# Patient Record
Sex: FEMALE | Race: WHITE | NOT HISPANIC OR LATINO | Employment: STUDENT | URBAN - METROPOLITAN AREA
[De-identification: names, ages, dates, MRNs, and addresses within clinical notes are randomized per-mention and may not be internally consistent; named-entity substitution may affect disease eponyms.]

---

## 2024-08-26 ENCOUNTER — OFFICE VISIT (OUTPATIENT)
Dept: URGENT CARE | Facility: URGENT CARE | Age: 24
End: 2024-08-26

## 2024-08-26 VITALS
SYSTOLIC BLOOD PRESSURE: 125 MMHG | TEMPERATURE: 98.1 F | HEART RATE: 92 BPM | RESPIRATION RATE: 14 BRPM | DIASTOLIC BLOOD PRESSURE: 77 MMHG | OXYGEN SATURATION: 100 %

## 2024-08-26 DIAGNOSIS — L08.9 LOCAL INFECTION OF SKIN AND SUBCUTANEOUS TISSUE: ICD-10-CM

## 2024-08-26 DIAGNOSIS — L30.9 ECZEMA, UNSPECIFIED TYPE: Primary | ICD-10-CM

## 2024-08-26 PROCEDURE — 99203 OFFICE O/P NEW LOW 30 MIN: CPT | Performed by: PHYSICIAN ASSISTANT

## 2024-08-26 RX ORDER — TRIAMCINOLONE ACETONIDE 1 MG/G
OINTMENT TOPICAL 2 TIMES DAILY
Qty: 80 G | Refills: 1 | Status: SHIPPED | OUTPATIENT
Start: 2024-08-26 | End: 2024-09-09

## 2024-08-26 RX ORDER — CEPHALEXIN 500 MG/1
500 CAPSULE ORAL 4 TIMES DAILY
Qty: 28 CAPSULE | Refills: 0 | Status: SHIPPED | OUTPATIENT
Start: 2024-08-26 | End: 2024-09-02

## 2024-08-27 NOTE — PROGRESS NOTES
Assessment & Plan     (L30.9) Eczema, unspecified type  (primary encounter diagnosis)  Comment:   Plan: triamcinolone (KENALOG) 0.1 % external         ointment, Adult Dermatology  Referral      Presentation and physical exam are consistent with eczema especially affecting the hands, also affecting the forearms and elbows and posterior aspect of the knees.  Prescribe triamcinolone ointment to be applied twice daily.  Recommend applying ointment after routine skin cleansing and then applying Saran wrap after ointment is applied.  This will keep the ointment in contact with the skin longer.  Also prescribed Keflex due to concern for localized skin infection, especially on hands.  Also provided a dermatology referral given the patient has had chronic eczema with acute flares such as what is evident today on exam.    No findings consistent with angioedema or anaphylaxis today.  Recommend urgent follow-up with the patient develops worsening breathing concerns, throat swelling, chest pain or shortness of breath or high fevers.  The patient verbalizes understanding and agrees with the above plan.        (L08.9) Local infection of skin and subcutaneous tissue  Comment:   Plan: cephALEXin (KEFLEX) 500 MG capsule                    No follow-ups on file.    Jason Oseguera PA-C  [facility] [place]    Patricia Ruiz is a 23 year old female who presents to clinic today for the following health issues:  Chief Complaint   Patient presents with    Urgent Care    Rash     Patient presents with a rash on both hands, arms and legs. Patient states [place]        HPI    The patient is a 23-year-old female with a past medical history of eczema who presents for evaluation of rash especially in her hands, forearms and legs.  States that she was visiting her parents from over the weekend.  States the rash tends to get worse when traveling.  She has been treated the  rash with triamcinolone cream.  On occasion she has needed Keflex due to infection associated with the rash she developed a lot of open sores.  She has needed prednisone at times for treatment of the rash as well.  She has no symptoms consistent with angioedema or anaphylaxis today.  No concerns for breathing or swallowing.  Would like some help treating the rash today.      Review of Systems  Constitutional, HEENT, cardiovascular, pulmonary, gi and gu systems are negative, except as otherwise noted.      Objective    /77 (BP Location: Right arm)   Pulse 92   Temp 98.1  F (36.7  C) (Temporal)   Resp 14   SpO2 100%   Physical Exam  Constitutional:       General: She is not in acute distress.     Appearance: Normal appearance. She is not ill-appearing, toxic-appearing or diaphoretic.   HENT:      Head: Normocephalic and atraumatic.      Mouth/Throat:      Mouth: Mucous membranes are moist.      Pharynx: Oropharynx is clear. No oropharyngeal exudate or posterior oropharyngeal erythema.   Pulmonary:      Effort: Pulmonary effort is normal. No respiratory distress.      Breath sounds: Normal breath sounds. No stridor. No wheezing, rhonchi or rales.   Skin:     General: Skin is warm and dry.      Findings: Rash present.      Comments: Eczematous rash with multiple excoriation on the dorsal and palmar aspect of the hands and fingers.  Eczematous rash on the forearms, less severe than on the hands.  Hands are somewhat warm to touch.  Mild eczematous rash at the elbows and at the posterior aspect of the knees bilaterally.  Rash does not luis to pressure.  No fluctuance, no deformities.   Neurological:      General: No focal deficit present.      Mental Status: She is alert and oriented to person, place, and time.